# Patient Record
Sex: FEMALE | Race: WHITE | Employment: FULL TIME | URBAN - METROPOLITAN AREA
[De-identification: names, ages, dates, MRNs, and addresses within clinical notes are randomized per-mention and may not be internally consistent; named-entity substitution may affect disease eponyms.]

---

## 2023-03-21 ENCOUNTER — HOSPITAL ENCOUNTER (EMERGENCY)
Age: 23
Discharge: HOME | End: 2023-03-21
Attending: EMERGENCY MEDICINE
Payer: COMMERCIAL

## 2023-03-21 VITALS
OXYGEN SATURATION: 99 % | HEART RATE: 67 BPM | SYSTOLIC BLOOD PRESSURE: 121 MMHG | DIASTOLIC BLOOD PRESSURE: 82 MMHG | RESPIRATION RATE: 18 BRPM | TEMPERATURE: 97.9 F | WEIGHT: 151.24 LBS

## 2023-03-21 DIAGNOSIS — S06.0X0A CONCUSSION WITHOUT LOSS OF CONSCIOUSNESS, INITIAL ENCOUNTER: ICD-10-CM

## 2023-03-21 DIAGNOSIS — R51.9 NONINTRACTABLE EPISODIC HEADACHE, UNSPECIFIED HEADACHE TYPE: Primary | ICD-10-CM

## 2023-03-21 LAB
ALBUMIN SERPL-MCNC: 4.5 G/DL (ref 3.5–5)
ALBUMIN/GLOB SERPL: 1.1 (ref 1.1–2.2)
ALP SERPL-CCNC: 59 U/L (ref 45–117)
ALT SERPL-CCNC: 23 U/L (ref 12–78)
ANION GAP SERPL CALC-SCNC: 2 MMOL/L (ref 5–15)
AST SERPL-CCNC: 18 U/L (ref 15–37)
BASOPHILS # BLD: 0 K/UL (ref 0–0.1)
BASOPHILS NFR BLD: 0 % (ref 0–1)
BILIRUB SERPL-MCNC: 0.2 MG/DL (ref 0.2–1)
BUN SERPL-MCNC: 11 MG/DL (ref 6–20)
BUN/CREAT SERPL: 13 (ref 12–20)
CALCIUM SERPL-MCNC: 9.5 MG/DL (ref 8.5–10.1)
CHLORIDE SERPL-SCNC: 107 MMOL/L (ref 97–108)
CO2 SERPL-SCNC: 27 MMOL/L (ref 21–32)
COMMENT, HOLDF: NORMAL
CREAT SERPL-MCNC: 0.84 MG/DL (ref 0.55–1.02)
DIFFERENTIAL METHOD BLD: NORMAL
EOSINOPHIL # BLD: 0.1 K/UL (ref 0–0.4)
EOSINOPHIL NFR BLD: 1 % (ref 0–7)
ERYTHROCYTE [DISTWIDTH] IN BLOOD BY AUTOMATED COUNT: 11.8 % (ref 11.5–14.5)
GLOBULIN SER CALC-MCNC: 4.1 G/DL (ref 2–4)
GLUCOSE SERPL-MCNC: 74 MG/DL (ref 65–100)
HCG UR QL: NEGATIVE
HCT VFR BLD AUTO: 43 % (ref 35–47)
HGB BLD-MCNC: 13.8 G/DL (ref 11.5–16)
IMM GRANULOCYTES # BLD AUTO: 0 K/UL (ref 0–0.04)
IMM GRANULOCYTES NFR BLD AUTO: 0 % (ref 0–0.5)
LYMPHOCYTES # BLD: 2.5 K/UL (ref 0.8–3.5)
LYMPHOCYTES NFR BLD: 24 % (ref 12–49)
MCH RBC QN AUTO: 30.7 PG (ref 26–34)
MCHC RBC AUTO-ENTMCNC: 32.1 G/DL (ref 30–36.5)
MCV RBC AUTO: 95.8 FL (ref 80–99)
MONOCYTES # BLD: 0.5 K/UL (ref 0–1)
MONOCYTES NFR BLD: 5 % (ref 5–13)
NEUTS SEG # BLD: 7.2 K/UL (ref 1.8–8)
NEUTS SEG NFR BLD: 70 % (ref 32–75)
NRBC # BLD: 0 K/UL (ref 0–0.01)
NRBC BLD-RTO: 0 PER 100 WBC
PLATELET # BLD AUTO: 313 K/UL (ref 150–400)
PMV BLD AUTO: 9.8 FL (ref 8.9–12.9)
POTASSIUM SERPL-SCNC: 4.2 MMOL/L (ref 3.5–5.1)
PROT SERPL-MCNC: 8.6 G/DL (ref 6.4–8.2)
RBC # BLD AUTO: 4.49 M/UL (ref 3.8–5.2)
SAMPLES BEING HELD,HOLD: NORMAL
SODIUM SERPL-SCNC: 136 MMOL/L (ref 136–145)
WBC # BLD AUTO: 10.4 K/UL (ref 3.6–11)

## 2023-03-21 PROCEDURE — 85025 COMPLETE CBC W/AUTO DIFF WBC: CPT

## 2023-03-21 PROCEDURE — 80053 COMPREHEN METABOLIC PANEL: CPT

## 2023-03-21 PROCEDURE — 99284 EMERGENCY DEPT VISIT MOD MDM: CPT

## 2023-03-21 PROCEDURE — 96375 TX/PRO/DX INJ NEW DRUG ADDON: CPT

## 2023-03-21 PROCEDURE — 96374 THER/PROPH/DIAG INJ IV PUSH: CPT

## 2023-03-21 PROCEDURE — 36415 COLL VENOUS BLD VENIPUNCTURE: CPT

## 2023-03-21 PROCEDURE — 74011250636 HC RX REV CODE- 250/636: Performed by: STUDENT IN AN ORGANIZED HEALTH CARE EDUCATION/TRAINING PROGRAM

## 2023-03-21 PROCEDURE — 81025 URINE PREGNANCY TEST: CPT

## 2023-03-21 RX ORDER — NAPROXEN 500 MG/1
500 TABLET ORAL
Qty: 20 TABLET | Refills: 0 | Status: SHIPPED | OUTPATIENT
Start: 2023-03-21

## 2023-03-21 RX ORDER — NAPROXEN 500 MG/1
500 TABLET ORAL
Qty: 20 TABLET | Refills: 0 | Status: SHIPPED | OUTPATIENT
Start: 2023-03-21 | End: 2023-03-21 | Stop reason: SDUPTHER

## 2023-03-21 RX ORDER — METHOCARBAMOL 750 MG/1
750 TABLET, FILM COATED ORAL
Qty: 20 TABLET | Refills: 0 | Status: SHIPPED | OUTPATIENT
Start: 2023-03-21 | End: 2023-03-21 | Stop reason: SDUPTHER

## 2023-03-21 RX ORDER — DEXAMETHASONE SODIUM PHOSPHATE 10 MG/ML
10 INJECTION INTRAMUSCULAR; INTRAVENOUS ONCE
Status: COMPLETED | OUTPATIENT
Start: 2023-03-21 | End: 2023-03-21

## 2023-03-21 RX ORDER — METHYLPREDNISOLONE 4 MG/1
TABLET ORAL
Qty: 1 DOSE PACK | Refills: 0 | Status: SHIPPED | OUTPATIENT
Start: 2023-03-21

## 2023-03-21 RX ORDER — METHOCARBAMOL 750 MG/1
750 TABLET, FILM COATED ORAL
Qty: 20 TABLET | Refills: 0 | Status: SHIPPED | OUTPATIENT
Start: 2023-03-21

## 2023-03-21 RX ORDER — ONDANSETRON 4 MG/1
4 TABLET, FILM COATED ORAL
Qty: 20 TABLET | Refills: 0 | Status: SHIPPED | OUTPATIENT
Start: 2023-03-21 | End: 2023-03-21 | Stop reason: SDUPTHER

## 2023-03-21 RX ORDER — KETOROLAC TROMETHAMINE 30 MG/ML
15 INJECTION, SOLUTION INTRAMUSCULAR; INTRAVENOUS
Status: COMPLETED | OUTPATIENT
Start: 2023-03-21 | End: 2023-03-21

## 2023-03-21 RX ORDER — METOCLOPRAMIDE HYDROCHLORIDE 5 MG/ML
10 INJECTION INTRAMUSCULAR; INTRAVENOUS
Status: COMPLETED | OUTPATIENT
Start: 2023-03-21 | End: 2023-03-21

## 2023-03-21 RX ORDER — ONDANSETRON 4 MG/1
4 TABLET, FILM COATED ORAL
Qty: 20 TABLET | Refills: 0 | Status: SHIPPED | OUTPATIENT
Start: 2023-03-21

## 2023-03-21 RX ORDER — METHYLPREDNISOLONE 4 MG/1
TABLET ORAL
Qty: 1 DOSE PACK | Refills: 0 | Status: SHIPPED | OUTPATIENT
Start: 2023-03-21 | End: 2023-03-21 | Stop reason: SDUPTHER

## 2023-03-21 RX ADMIN — KETOROLAC TROMETHAMINE 15 MG: 30 INJECTION, SOLUTION INTRAMUSCULAR at 12:58

## 2023-03-21 RX ADMIN — DEXAMETHASONE SODIUM PHOSPHATE 10 MG: 10 INJECTION INTRAMUSCULAR; INTRAVENOUS at 12:59

## 2023-03-21 RX ADMIN — SODIUM CHLORIDE 1000 ML: 9 INJECTION, SOLUTION INTRAVENOUS at 12:57

## 2023-03-21 RX ADMIN — METOCLOPRAMIDE 10 MG: 5 INJECTION, SOLUTION INTRAMUSCULAR; INTRAVENOUS at 12:59

## 2023-03-21 NOTE — ED TRIAGE NOTES
Pt arrives ambulatory from home w/ complaint of headache. Pt states she fell skiing on 3/11 and has had persistent headaches and sensitivity to light following. Pt states she fell backwards but denies LOC during the event. Pt states she has experienced these symptoms before when she had a concussion. Pt also has hx of POTS.

## 2023-03-21 NOTE — ED PROVIDER NOTES
HPI   Patient is a 21 y.o. F who presents today with complaints of headache. She went skiing on 3/11, fell backwards and hit her head. Denies LOC, no vomiting. No cuts or lacerations from fall. Has had a headache since 3/11. Headache is on bilateral temples. Has been taking Tylenol for headache, resolves headache but comes back. +nausea, photosensitivity, brain fog, decreased concentration, intermittent blurry vision. Denies any neck pain, back pain, syncope, seizure, no focal weakness, no facial droop, no change in gait, no loss of bowel bladder, no urinary retention, no paresthesias or numbness in extremities. History of POTS. On menstrual cycle. ALLERGIES: Patient has no known allergies. No past medical history on file. No past surgical history on file. Review of Systems   Constitutional:  Positive for fatigue. Negative for fever. HENT:  Negative for congestion. Eyes:  Positive for photophobia. Respiratory:  Negative for cough, shortness of breath and wheezing. Cardiovascular:  Negative for chest pain. Gastrointestinal:  Positive for nausea. Negative for abdominal pain, constipation, diarrhea and vomiting. Genitourinary:  Negative for flank pain. Musculoskeletal:  Negative for arthralgias, back pain, myalgias and neck pain. Skin:  Negative for wound. Neurological:  Positive for light-headedness and headaches. Negative for dizziness, tremors, seizures, syncope, facial asymmetry, speech difficulty, weakness and numbness. Psychiatric/Behavioral:  Positive for decreased concentration. Negative for confusion. Vitals:    03/21/23 1036   BP: 121/82   Pulse: 67   Resp: 18   Temp: 97.9 °F (36.6 °C)   SpO2: 99%   Weight: 68.6 kg (151 lb 3.8 oz)            Physical Exam  Vitals reviewed. Constitutional:       General: She is not in acute distress. Appearance: Normal appearance. She is not ill-appearing or toxic-appearing. HENT:      Head: Normocephalic and atraumatic. Nose: Nose normal.   Cardiovascular:      Rate and Rhythm: Normal rate and regular rhythm. Pulmonary:      Effort: Pulmonary effort is normal.      Breath sounds: Normal breath sounds. Abdominal:      Palpations: Abdomen is soft. Tenderness: There is no abdominal tenderness. Musculoskeletal:      Cervical back: Normal. No swelling, deformity, tenderness or bony tenderness. No pain with movement. Normal range of motion. Thoracic back: Normal. No swelling, deformity, tenderness or bony tenderness. Normal range of motion. Lumbar back: Normal. No signs of trauma, tenderness or bony tenderness. Normal range of motion. Comments: + Equal strength and sensation in bilateral upper and lower lower extremities   Skin:     General: Skin is warm and dry. Neurological:      General: No focal deficit present. Mental Status: She is alert and oriented to person, place, and time. Mental status is at baseline. Cranial Nerves: No dysarthria or facial asymmetry. Sensory: Sensation is intact. Motor: No weakness. Coordination: Coordination is intact. Gait: Gait is intact. Psychiatric:         Mood and Affect: Mood normal.         Behavior: Behavior normal.         Thought Content: Thought content normal.            LABORATORY RESULTS:  Recent Results (from the past 24 hour(s))   METABOLIC PANEL, COMPREHENSIVE    Collection Time: 03/21/23 11:16 AM   Result Value Ref Range    Sodium 136 136 - 145 mmol/L    Potassium 4.2 3.5 - 5.1 mmol/L    Chloride 107 97 - 108 mmol/L    CO2 27 21 - 32 mmol/L    Anion gap 2 (L) 5 - 15 mmol/L    Glucose 74 65 - 100 mg/dL    BUN 11 6 - 20 MG/DL    Creatinine 0.84 0.55 - 1.02 MG/DL    BUN/Creatinine ratio 13 12 - 20      eGFR >60 >60 ml/min/1.73m2    Calcium 9.5 8.5 - 10.1 MG/DL    Bilirubin, total 0.2 0.2 - 1.0 MG/DL    ALT (SGPT) 23 12 - 78 U/L    AST (SGOT) 18 15 - 37 U/L    Alk.  phosphatase 59 45 - 117 U/L    Protein, total 8.6 (H) 6.4 - 8.2 g/dL Albumin 4.5 3.5 - 5.0 g/dL    Globulin 4.1 (H) 2.0 - 4.0 g/dL    A-G Ratio 1.1 1.1 - 2.2     CBC WITH AUTOMATED DIFF    Collection Time: 03/21/23 11:16 AM   Result Value Ref Range    WBC 10.4 3.6 - 11.0 K/uL    RBC 4.49 3.80 - 5.20 M/uL    HGB 13.8 11.5 - 16.0 g/dL    HCT 43.0 35.0 - 47.0 %    MCV 95.8 80.0 - 99.0 FL    MCH 30.7 26.0 - 34.0 PG    MCHC 32.1 30.0 - 36.5 g/dL    RDW 11.8 11.5 - 14.5 %    PLATELET 866 931 - 589 K/uL    MPV 9.8 8.9 - 12.9 FL    NRBC 0.0 0  WBC    ABSOLUTE NRBC 0.00 0.00 - 0.01 K/uL    NEUTROPHILS 70 32 - 75 %    LYMPHOCYTES 24 12 - 49 %    MONOCYTES 5 5 - 13 %    EOSINOPHILS 1 0 - 7 %    BASOPHILS 0 0 - 1 %    IMMATURE GRANULOCYTES 0 0.0 - 0.5 %    ABS. NEUTROPHILS 7.2 1.8 - 8.0 K/UL    ABS. LYMPHOCYTES 2.5 0.8 - 3.5 K/UL    ABS. MONOCYTES 0.5 0.0 - 1.0 K/UL    ABS. EOSINOPHILS 0.1 0.0 - 0.4 K/UL    ABS. BASOPHILS 0.0 0.0 - 0.1 K/UL    ABS. IMM. GRANS. 0.0 0.00 - 0.04 K/UL    DF AUTOMATED     SAMPLES BEING HELD    Collection Time: 03/21/23 11:16 AM   Result Value Ref Range    SAMPLES BEING HELD 1BLU 1RED     COMMENT        Add-on orders for these samples will be processed based on acceptable specimen integrity and analyte stability, which may vary by analyte. HCG URINE, QL. - POC    Collection Time: 03/21/23 12:50 PM   Result Value Ref Range    Pregnancy test,urine (POC) Negative NEG         IMAGING RESULTS:  No results found. MEDICATIONS GIVEN:  Medications   sodium chloride 0.9 % bolus infusion 1,000 mL (0 mL IntraVENous IV Completed 3/21/23 1353)   metoclopramide HCl (REGLAN) injection 10 mg (10 mg IntraVENous Given 3/21/23 1259)   ketorolac (TORADOL) injection 15 mg (15 mg IntraVENous Given 3/21/23 1258)   dexamethasone (PF) (DECADRON) 10 mg/mL injection 10 mg (10 mg IntraVENous Given 3/21/23 1259)            Medical Decision Making  Amount and/or Complexity of Data Reviewed  Labs: ordered. Risk  Prescription drug management.       Patient presents to the emergency department with complaints of headache. History and exam most consistent with benign headache from either tension type headache vs migraine. No headache red flags. Neurologic exam without evidence of meningismus, AMS, focal neurologic findings. History and exam does not clinically correlate with meningitis, encephalitis, stroke, tumor or abscess. Presentation not consistent with acute intracranial bleed to include SAH (lack of risk factors, headache history). No history of high risk mechanism trauma so ICH unlikely. Given history and physical, temporal arteritis unlikely, as is acute angle closure glaucoma. Doubt carotid artery dissection given no focal neuro deficits, no neck trauma or recent neck strain. Pain was controlled with headache cocktail and patient discharged home with PMD follow up. Discussed results and work-up with patient and answered all questions, the patient expresses understanding and agrees with the care plan and disposition. The patient was given an opportunity to ask questions and all concerns raised were addressed prior to discharge. Recommended patient follow-up with provider as listed below. Counseled patient on standard home and self-care measures. Specifically explained the emergent conditions that could arise and clearly instructed the patient to return to the emergency department for those and any other new, worsening, or concerning symptoms. Patient stable and ready for discharge. History, exam, medical decision making, and plan discussed with ED attending, Dr. Cassandra Selby. IMPRESSION:  1. Nonintractable episodic headache, unspecified headache type    2.  Concussion without loss of consciousness, initial encounter        DISPOSITION:  Discharge    PLAN:  Follow-up Information       Follow up With Specialties Details Why [facility] Schedule an appointment as soon as possible for a visit   [address] [place] [address] [address]  [id number]  Schedule an appointment as soon as possible for a visit   Erika Alcaraz [address]  [phone]          Discharge Medication List as of 3/21/2023  2:00 PM        START taking these medications    Details   methocarbamoL (ROBAXIN) 750 mg tablet Take 1 Tablet by mouth four (4) times daily as needed for Muscle Spasm(s). , Print, Disp-20 Tablet, R-0      naproxen (NAPROSYN) 500 mg tablet Take 1 Tablet by mouth two (2) times daily as needed for Pain., Print, Disp-20 Tablet, R-0      ondansetron hcl (Zofran) 4 mg tablet Take 1 Tablet by mouth every eight (8) hours as needed for Nausea or Vomiting., Print, Disp-20 Tablet, R-0      methylPREDNISolone (Medrol, Melchor,) 4 mg tablet Follow dose pack instructions, Print, Disp-1 Dose Pack, R-0               Please note that this dictation was completed with [employer], the computer voice recognition software. Quite often unanticipated grammatical, syntax, homophones, and other interpretive errors are inadvertently transcribed by the computer software. Please disregard these errors. Please excuse any errors that have escaped final proofreading.

## 2023-03-21 NOTE — Clinical Note
[address]  [address] [facility] [id number]  [phone]    Work/School Note    Date: 3/21/2023    To Whom It May concern:    Bogdan Goodson was seen and treated today in the emergency room by the following provider(s):  Attending Provider: Nancy Aguilera MD  Physician Assistant: Robin Booker PA-C. Bogdan Goodson is excused from work/school on 3/21/2023 through 3/23/2023. She is medically clear to return to work/school on 3/24/2023.          Sincerely,          Alvino Cagle PA-C